# Patient Record
Sex: FEMALE | Race: WHITE
[De-identification: names, ages, dates, MRNs, and addresses within clinical notes are randomized per-mention and may not be internally consistent; named-entity substitution may affect disease eponyms.]

---

## 2017-07-12 ENCOUNTER — HOSPITAL ENCOUNTER (OUTPATIENT)
Dept: HOSPITAL 53 - M RAD | Age: 67
End: 2017-07-12
Attending: UROLOGY
Payer: MEDICARE

## 2017-07-12 DIAGNOSIS — Q62.11: Primary | ICD-10-CM

## 2017-07-12 PROCEDURE — 78708 K FLOW/FUNCT IMAGE W/DRUG: CPT

## 2017-07-12 NOTE — REP
RENAL NUCLEAR SCAN WITH FLOW AND FUNCTION, LASIX ADMINISTRATION:

 

Nuclear GFR evaluation.

 

Following the intravenous administration of 8.6 millicuries technetium 99m MAG 3,

immediate flow images are obtained in the posterior projection showing prompt and

symmetrical perfusion bilaterally.  Delayed renal function images are performed

for 30 minutes in the posterior projection.  The kidneys are normal in size and

position.  No parenchymal defect is seen. There is bilateral symmetrical

excretion.  There is mild bilateral pelvic caliectasis noted.  There is gradual

washout from both kidneys with no evidence of delayed nephrogram.  Split function

is 47.7% on the left and 52.3% on the right.  Time-to-peak is minutes on the left

and 2 minutes on the right.  T-1/2 is 13.7 minutes on the left and 14.7 minutes

on the right.  Renal function curves are slightly shallow in their downward

slopes.

 

Lasix was then given intravenously, 20 mg, and further imaging performed for 21

minutes.  There is complete clearance of radiotracer from both pelvicaliceal

systems indicating no evidence of urinary tract obstruction.  There is mild

postvoid residual in the urinary bladder after voiding.

 

Evaluation of GFR is performed following the intravenous administration of 3.2

millicuries technetium 99m DTPA.  There is bilateral symmetrical uptake in both

kidneys.  Split function is 49.3% on the left and 50.7% on the right.  GFR is

calculated on the left to be 50.2 mL per minute and on the right 51.7 mL per

minute or a total of 101.9 mL per minute.  Normalized GFR per patient body

surface area is 87.7 mL per minute.

 

 

Signed by

Uzair Melendez MD 07/12/2017 05:36 P

## 2019-04-11 ENCOUNTER — HOSPITAL ENCOUNTER (OUTPATIENT)
Dept: HOSPITAL 53 - M SMT | Age: 69
End: 2019-04-11
Attending: INTERNAL MEDICINE
Payer: COMMERCIAL

## 2019-04-11 DIAGNOSIS — Z87.09: Primary | ICD-10-CM

## 2019-04-12 NOTE — REP
Clinical:  Acute bronchitis.

 

Comparison: 04/19/2016 .

 

Technique:  PA and lateral.

 

Findings:

The mediastinum and cardiac silhouette are normal.  The lung fields are clear and

without acute consolidation, effusion, or pneumothorax.  1 cm calcified granuloma

in the left lower lobe remains stable.  The skeletal structures are intact and

normal.

 

Impression:

1.   No acute cardiopulmonary process.

 

 

Electronically Signed by

Jose Manuel Weber MD 04/12/2019 02:39 A

## 2020-08-04 ENCOUNTER — HOSPITAL ENCOUNTER (OUTPATIENT)
Dept: HOSPITAL 53 - M LAB REF | Age: 70
End: 2020-08-04
Attending: DERMATOLOGY
Payer: MEDICARE

## 2020-08-04 DIAGNOSIS — L72.0: Primary | ICD-10-CM

## 2020-08-04 PROCEDURE — 11102 TANGNTL BX SKIN SINGLE LES: CPT

## 2020-08-04 PROCEDURE — 88304 TISSUE EXAM BY PATHOLOGIST: CPT

## 2020-08-04 PROCEDURE — 17110 DESTRUCTION B9 LES UP TO 14: CPT

## 2021-02-20 ENCOUNTER — HOSPITAL ENCOUNTER (OUTPATIENT)
Dept: HOSPITAL 53 - M LABSMTC | Age: 71
Discharge: HOME | End: 2021-02-20
Attending: INTERNAL MEDICINE
Payer: MEDICARE

## 2021-02-20 DIAGNOSIS — Z11.52: Primary | ICD-10-CM

## 2021-06-28 ENCOUNTER — HOSPITAL ENCOUNTER (OUTPATIENT)
Dept: HOSPITAL 53 - M LAB REF | Age: 71
End: 2021-06-28
Attending: PHYSICIAN ASSISTANT
Payer: MEDICARE

## 2021-06-28 DIAGNOSIS — C44.729: ICD-10-CM

## 2021-06-28 DIAGNOSIS — C44.722: Primary | ICD-10-CM

## 2021-06-28 PROCEDURE — 11103 TANGNTL BX SKIN EA SEP/ADDL: CPT

## 2021-06-28 PROCEDURE — 88305 TISSUE EXAM BY PATHOLOGIST: CPT

## 2021-06-28 PROCEDURE — 11102 TANGNTL BX SKIN SINGLE LES: CPT

## 2021-06-28 PROCEDURE — 17110 DESTRUCTION B9 LES UP TO 14: CPT

## 2021-08-13 ENCOUNTER — HOSPITAL ENCOUNTER (OUTPATIENT)
Dept: HOSPITAL 53 - M PLAIMG | Age: 71
End: 2021-08-13
Attending: INTERNAL MEDICINE
Payer: COMMERCIAL

## 2021-08-13 DIAGNOSIS — Z57.39: Primary | ICD-10-CM

## 2021-08-13 NOTE — REP
INDICATION:

OCCUPATIONAL EXPOSURE TO OTHER AIR CONTAMINANTS.



COMPARISON:

04/11/2019



TECHNIQUE:

Two views



FINDINGS:

calcified granuloma left lower lobe unchanged when compared with previous studies.

The lungs are free of active parenchymal disease.  The heart is not enlarged.  There

is no failure.



IMPRESSION:

No active process.  No interval change.





<Electronically signed by Geovanni Chaney > 08/13/21 0049

## 2021-08-20 ENCOUNTER — HOSPITAL ENCOUNTER (OUTPATIENT)
Dept: HOSPITAL 53 - M WHC | Age: 71
End: 2021-08-20
Attending: NURSE PRACTITIONER
Payer: MEDICARE

## 2021-08-20 DIAGNOSIS — Z80.49: ICD-10-CM

## 2021-08-20 DIAGNOSIS — Z80.0: ICD-10-CM

## 2021-08-20 DIAGNOSIS — Z12.31: Primary | ICD-10-CM

## 2021-08-20 DIAGNOSIS — Z80.42: ICD-10-CM

## 2021-08-20 NOTE — REPMRS
Patient History

The patient states she had a clinical breast exam in June 2021.

Patient has history of other cancer at age 71.

Family history of colorectal cancer at age 50 or over in mother, 

prostate cancer at age 50 or over in father, endometrial cancer 

at age 50 or over in daughter.

Reductions of both breasts, 1980.

Taking estrogen for 2 years.

Patient states no breast complaints today. Patient has signed MRS

History Sheet.

 

Digital Woman Screen Mammo: August 20, 2021 - Exam #: 

[id number]

Bilateral CC and MLO view(s) were taken.

 

Technologist: Giovana Mcgarry, Technologist

Prior study comparison: June 12, 2020, bilateral digital mammo 

screening bilat, performed at [facility].  May 

17, 2019, bilateral digital mammo screening bilat, performed at 

[facility] [employer] [facility].

 

FINDINGS: There are scattered fibroglandular densities.  

Screening.

 

Digital screening (2D) mammography was performed bilaterally in 

the CC and MLO projections. Additionally, breast tomosynthesis 

(3D mammography) was performed bilaterally in the CC and MLO 

projections. Todays exam was compared to the prior exam/exams.

 

By history, the patient has no complaints of a palpable breast 

abnormality or other significant breast complaints.

 

The breasts are unchanged in size and shape.There is stable 

post-procedural internal architectural distortion. There are no 

deana-soft tissue densities or spiculated masses. There is no new 

internal architectural distortion. There are no suspicious 

deana-calcific clusters. Skin thickening or nipple retraction is 

not present.

 

IMPRESSION:

The Volpara volumetric breast density category is B, there are 

scattered areas of fibroglandular densities.

BI-RADS Category 2- Benign Findings. There is no evidence of 

malignant alteration of the breasts. Followup examination 

recommended in one year.

The lifetime Tyrer-Cuzick score is   4.4   %

This mammogram was read with the assistance of i[employer] PowerLook 

AMP,an FDA approved computer aided detection system for 

mammography.

 

Negative x-ray reports should not delay surgical consultation if 

a dominant or clinically suspicious mass is present.

 

Not all breast cancers can be identified by mammography. 

Therefore, we recommend that you continue to perform regular 

breast self-examination and physical examination and then 

promptly contact your physician of any concerns or changes.

 

Adenosis and dense breasts may obscure an underlying neoplasm.

 

Assessment: BI-RADS/ACR category 2 mammogram. Benign Findings.

 

Recommendation

Routine screening mammogram of both breasts in 1 year.

 

Electronically Signed By: Fam Hodges DO 08/20/21 0802

## 2022-08-19 ENCOUNTER — HOSPITAL ENCOUNTER (OUTPATIENT)
Dept: HOSPITAL 53 - M WHC | Age: 72
End: 2022-08-19
Attending: FAMILY MEDICINE
Payer: MEDICARE

## 2022-08-19 DIAGNOSIS — Z12.31: Primary | ICD-10-CM

## 2022-09-27 ENCOUNTER — HOSPITAL ENCOUNTER (INPATIENT)
Dept: HOSPITAL 53 - M ED | Age: 72
LOS: 6 days | Discharge: HOME | DRG: 880 | End: 2022-10-03
Attending: STUDENT IN AN ORGANIZED HEALTH CARE EDUCATION/TRAINING PROGRAM | Admitting: PSYCHIATRY & NEUROLOGY
Payer: MEDICARE

## 2022-09-27 VITALS — HEIGHT: 64 IN | BODY MASS INDEX: 24.54 KG/M2 | WEIGHT: 143.74 LBS

## 2022-09-27 DIAGNOSIS — Z90.79: ICD-10-CM

## 2022-09-27 DIAGNOSIS — Z91.51: ICD-10-CM

## 2022-09-27 DIAGNOSIS — I10: ICD-10-CM

## 2022-09-27 DIAGNOSIS — F43.0: Primary | ICD-10-CM

## 2022-09-27 DIAGNOSIS — F43.21: ICD-10-CM

## 2022-09-27 DIAGNOSIS — Z63.0: ICD-10-CM

## 2022-09-27 DIAGNOSIS — E78.5: ICD-10-CM

## 2022-09-27 DIAGNOSIS — I85.00: ICD-10-CM

## 2022-09-27 DIAGNOSIS — J45.909: ICD-10-CM

## 2022-09-27 DIAGNOSIS — Z88.6: ICD-10-CM

## 2022-09-27 DIAGNOSIS — Z63.4: ICD-10-CM

## 2022-09-27 DIAGNOSIS — G43.909: ICD-10-CM

## 2022-09-27 DIAGNOSIS — I25.10: ICD-10-CM

## 2022-09-27 DIAGNOSIS — Z87.891: ICD-10-CM

## 2022-09-27 DIAGNOSIS — M10.9: ICD-10-CM

## 2022-09-27 DIAGNOSIS — Z79.899: ICD-10-CM

## 2022-09-27 DIAGNOSIS — Z88.8: ICD-10-CM

## 2022-09-27 DIAGNOSIS — K75.81: ICD-10-CM

## 2022-09-27 LAB
ALBUMIN SERPL BCG-MCNC: 3.4 GM/DL (ref 3.2–5.2)
ALT SERPL W P-5'-P-CCNC: 36 U/L (ref 12–78)
AMPHETAMINES UR QL SCN: NEGATIVE
APAP SERPL-MCNC: < 2 UG/ML (ref 10–30)
BARBITURATES UR QL SCN: NEGATIVE
BASOPHILS # BLD AUTO: 0 10^3/UL (ref 0–0.2)
BASOPHILS NFR BLD AUTO: 0.8 % (ref 0–1)
BENZODIAZ UR QL SCN: NEGATIVE
BILIRUB CONJ SERPL-MCNC: 0.5 MG/DL (ref 0–0.2)
BILIRUB SERPL-MCNC: 1.9 MG/DL (ref 0.2–1)
BUN SERPL-MCNC: 15 MG/DL (ref 7–18)
BZE UR QL SCN: NEGATIVE
CALCIUM SERPL-MCNC: 8.8 MG/DL (ref 8.8–10.2)
CANNABINOIDS UR QL SCN: NEGATIVE
CHLORIDE SERPL-SCNC: 112 MEQ/L (ref 98–107)
CO2 SERPL-SCNC: 24 MEQ/L (ref 21–32)
CREAT SERPL-MCNC: 0.63 MG/DL (ref 0.55–1.3)
EOSINOPHIL # BLD AUTO: 0.1 10^3/UL (ref 0–0.5)
EOSINOPHIL NFR BLD AUTO: 2.6 % (ref 0–3)
ETHANOL SERPL-MCNC: < 0.003 % (ref 0–0.01)
GFR SERPL CREATININE-BSD FRML MDRD: > 60 ML/MIN/{1.73_M2} (ref 39–?)
GLUCOSE SERPL-MCNC: 95 MG/DL (ref 70–100)
HCT VFR BLD AUTO: 41.8 % (ref 36–47)
HGB BLD-MCNC: 13.9 G/DL (ref 12–15.5)
LYMPHOCYTES # BLD AUTO: 1.4 10^3/UL (ref 1.5–5)
LYMPHOCYTES NFR BLD AUTO: 26.7 % (ref 24–44)
MCH RBC QN AUTO: 32.1 PG (ref 27–33)
MCHC RBC AUTO-ENTMCNC: 33.3 G/DL (ref 32–36.5)
MCV RBC AUTO: 96.5 FL (ref 80–96)
METHADONE UR QL SCN: NEGATIVE
MONOCYTES # BLD AUTO: 0.4 10^3/UL (ref 0–0.8)
MONOCYTES NFR BLD AUTO: 7 % (ref 2–8)
NEUTROPHILS # BLD AUTO: 3.3 10^3/UL (ref 1.5–8.5)
NEUTROPHILS NFR BLD AUTO: 62.7 % (ref 36–66)
OPIATES UR QL SCN: NEGATIVE
PCP UR QL SCN: NEGATIVE
PLATELET # BLD AUTO: 107 10^3/UL (ref 150–450)
POTASSIUM SERPL-SCNC: 3.5 MEQ/L (ref 3.5–5.1)
PROT SERPL-MCNC: 6.7 GM/DL (ref 6.4–8.2)
RBC # BLD AUTO: 4.33 10^6/UL (ref 4–5.4)
SALICYLATES SERPL-MCNC: < 1.7 MG/DL (ref 5–30)
SODIUM SERPL-SCNC: 142 MEQ/L (ref 136–145)
TSH SERPL DL<=0.005 MIU/L-ACNC: 2.03 UIU/ML (ref 0.36–3.74)
WBC # BLD AUTO: 5.3 10^3/UL (ref 4–10)

## 2022-09-27 SDOH — SOCIAL STABILITY - SOCIAL INSECURITY: DISSAPEARANCE AND DEATH OF FAMILY MEMBER: Z63.4

## 2022-09-27 SDOH — SOCIAL STABILITY - SOCIAL INSECURITY: PROBLEMS IN RELATIONSHIP WITH SPOUSE OR PARTNER: Z63.0

## 2022-09-28 VITALS — DIASTOLIC BLOOD PRESSURE: 91 MMHG | SYSTOLIC BLOOD PRESSURE: 157 MMHG

## 2022-09-29 VITALS — SYSTOLIC BLOOD PRESSURE: 111 MMHG | DIASTOLIC BLOOD PRESSURE: 67 MMHG

## 2022-09-29 RX ADMIN — PANTOPRAZOLE SODIUM SCH MG: 40 TABLET, DELAYED RELEASE ORAL at 00:10

## 2022-09-29 RX ADMIN — NADOLOL SCH MG: 20 TABLET ORAL at 21:32

## 2022-09-29 RX ADMIN — FLUTICASONE PROPIONATE AND SALMETEROL XINAFOATE SCH PUFF: 230; 21 AEROSOL, METERED RESPIRATORY (INHALATION) at 22:22

## 2022-09-29 RX ADMIN — PANTOPRAZOLE SODIUM SCH MG: 40 TABLET, DELAYED RELEASE ORAL at 09:07

## 2022-09-29 RX ADMIN — PANTOPRAZOLE SODIUM SCH MG: 40 TABLET, DELAYED RELEASE ORAL at 21:32

## 2022-09-29 RX ADMIN — POTASSIUM CHLORIDE SCH MEQ: 750 TABLET, EXTENDED RELEASE ORAL at 00:10

## 2022-09-29 RX ADMIN — ZONISAMIDE SCH MG: 100 CAPSULE ORAL at 00:09

## 2022-09-29 RX ADMIN — NADOLOL SCH MG: 20 TABLET ORAL at 00:09

## 2022-09-29 RX ADMIN — FUROSEMIDE SCH MG: 20 TABLET ORAL at 09:07

## 2022-09-29 RX ADMIN — POTASSIUM CHLORIDE SCH MEQ: 750 TABLET, EXTENDED RELEASE ORAL at 21:32

## 2022-09-29 RX ADMIN — ZONISAMIDE SCH MG: 100 CAPSULE ORAL at 21:30

## 2022-09-30 VITALS — DIASTOLIC BLOOD PRESSURE: 54 MMHG | SYSTOLIC BLOOD PRESSURE: 90 MMHG

## 2022-09-30 VITALS — DIASTOLIC BLOOD PRESSURE: 77 MMHG | SYSTOLIC BLOOD PRESSURE: 130 MMHG

## 2022-09-30 RX ADMIN — NADOLOL SCH MG: 20 TABLET ORAL at 21:31

## 2022-09-30 RX ADMIN — ZONISAMIDE SCH MG: 100 CAPSULE ORAL at 21:30

## 2022-09-30 RX ADMIN — POTASSIUM CHLORIDE SCH MEQ: 750 TABLET, EXTENDED RELEASE ORAL at 21:30

## 2022-09-30 RX ADMIN — FLUTICASONE PROPIONATE AND SALMETEROL XINAFOATE SCH PUFF: 230; 21 AEROSOL, METERED RESPIRATORY (INHALATION) at 08:33

## 2022-09-30 RX ADMIN — FUROSEMIDE SCH MG: 20 TABLET ORAL at 08:33

## 2022-09-30 RX ADMIN — FLUTICASONE PROPIONATE AND SALMETEROL XINAFOATE SCH PUFF: 230; 21 AEROSOL, METERED RESPIRATORY (INHALATION) at 21:30

## 2022-09-30 RX ADMIN — PANTOPRAZOLE SODIUM SCH MG: 40 TABLET, DELAYED RELEASE ORAL at 21:30

## 2022-09-30 RX ADMIN — ESCITALOPRAM OXALATE SCH MG: 10 TABLET, FILM COATED ORAL at 13:00

## 2022-09-30 RX ADMIN — PANTOPRAZOLE SODIUM SCH MG: 40 TABLET, DELAYED RELEASE ORAL at 08:33

## 2022-10-01 VITALS — DIASTOLIC BLOOD PRESSURE: 78 MMHG | SYSTOLIC BLOOD PRESSURE: 128 MMHG

## 2022-10-01 VITALS — SYSTOLIC BLOOD PRESSURE: 142 MMHG | DIASTOLIC BLOOD PRESSURE: 82 MMHG

## 2022-10-01 RX ADMIN — NADOLOL SCH MG: 20 TABLET ORAL at 20:53

## 2022-10-01 RX ADMIN — PANTOPRAZOLE SODIUM SCH MG: 40 TABLET, DELAYED RELEASE ORAL at 08:43

## 2022-10-01 RX ADMIN — ESCITALOPRAM OXALATE SCH MG: 10 TABLET, FILM COATED ORAL at 08:43

## 2022-10-01 RX ADMIN — ZONISAMIDE SCH MG: 100 CAPSULE ORAL at 20:52

## 2022-10-01 RX ADMIN — FLUTICASONE PROPIONATE AND SALMETEROL XINAFOATE SCH PUFF: 230; 21 AEROSOL, METERED RESPIRATORY (INHALATION) at 20:52

## 2022-10-01 RX ADMIN — PANTOPRAZOLE SODIUM SCH MG: 40 TABLET, DELAYED RELEASE ORAL at 20:52

## 2022-10-01 RX ADMIN — POTASSIUM CHLORIDE SCH MEQ: 750 TABLET, EXTENDED RELEASE ORAL at 20:52

## 2022-10-01 RX ADMIN — FLUTICASONE PROPIONATE AND SALMETEROL XINAFOATE SCH PUFF: 230; 21 AEROSOL, METERED RESPIRATORY (INHALATION) at 08:45

## 2022-10-01 RX ADMIN — FUROSEMIDE SCH MG: 20 TABLET ORAL at 08:43

## 2022-10-02 VITALS — DIASTOLIC BLOOD PRESSURE: 59 MMHG | SYSTOLIC BLOOD PRESSURE: 101 MMHG

## 2022-10-02 VITALS — DIASTOLIC BLOOD PRESSURE: 59 MMHG | SYSTOLIC BLOOD PRESSURE: 127 MMHG

## 2022-10-02 VITALS — SYSTOLIC BLOOD PRESSURE: 127 MMHG | DIASTOLIC BLOOD PRESSURE: 59 MMHG

## 2022-10-02 RX ADMIN — POTASSIUM CHLORIDE SCH MEQ: 750 TABLET, EXTENDED RELEASE ORAL at 20:25

## 2022-10-02 RX ADMIN — ZONISAMIDE SCH MG: 100 CAPSULE ORAL at 20:24

## 2022-10-02 RX ADMIN — NADOLOL SCH MG: 20 TABLET ORAL at 20:27

## 2022-10-02 RX ADMIN — PANTOPRAZOLE SODIUM SCH MG: 40 TABLET, DELAYED RELEASE ORAL at 07:28

## 2022-10-02 RX ADMIN — FLUTICASONE PROPIONATE AND SALMETEROL XINAFOATE SCH PUFF: 230; 21 AEROSOL, METERED RESPIRATORY (INHALATION) at 07:29

## 2022-10-02 RX ADMIN — FLUTICASONE PROPIONATE AND SALMETEROL XINAFOATE SCH PUFF: 230; 21 AEROSOL, METERED RESPIRATORY (INHALATION) at 20:24

## 2022-10-02 RX ADMIN — ESCITALOPRAM OXALATE SCH MG: 10 TABLET, FILM COATED ORAL at 07:29

## 2022-10-02 RX ADMIN — FUROSEMIDE SCH MG: 20 TABLET ORAL at 07:28

## 2022-10-02 RX ADMIN — PANTOPRAZOLE SODIUM SCH MG: 40 TABLET, DELAYED RELEASE ORAL at 20:25

## 2022-10-03 VITALS — DIASTOLIC BLOOD PRESSURE: 3 MMHG | SYSTOLIC BLOOD PRESSURE: 134 MMHG

## 2022-10-03 RX ADMIN — PANTOPRAZOLE SODIUM SCH MG: 40 TABLET, DELAYED RELEASE ORAL at 08:17

## 2022-10-03 RX ADMIN — FUROSEMIDE SCH MG: 20 TABLET ORAL at 08:17

## 2022-10-03 RX ADMIN — ESCITALOPRAM OXALATE SCH MG: 10 TABLET, FILM COATED ORAL at 08:17

## 2022-10-03 RX ADMIN — FLUTICASONE PROPIONATE AND SALMETEROL XINAFOATE SCH PUFF: 230; 21 AEROSOL, METERED RESPIRATORY (INHALATION) at 08:00

## 2022-12-08 ENCOUNTER — HOSPITAL ENCOUNTER (OUTPATIENT)
Dept: HOSPITAL 53 - M PLAIMG | Age: 72
End: 2022-12-08
Attending: INTERNAL MEDICINE
Payer: MEDICARE

## 2022-12-08 DIAGNOSIS — Z87.09: Primary | ICD-10-CM

## 2022-12-08 DIAGNOSIS — J98.4: ICD-10-CM

## 2022-12-08 DIAGNOSIS — J68.3: ICD-10-CM

## 2023-06-30 ENCOUNTER — HOSPITAL ENCOUNTER (OUTPATIENT)
Dept: HOSPITAL 53 - M RAD | Age: 73
End: 2023-06-30
Payer: MEDICARE

## 2023-06-30 DIAGNOSIS — I10: ICD-10-CM

## 2023-06-30 DIAGNOSIS — G30.9: Primary | ICD-10-CM

## 2023-07-28 ENCOUNTER — HOSPITAL ENCOUNTER (OUTPATIENT)
Dept: HOSPITAL 53 - M RAD | Age: 73
End: 2023-07-28
Attending: INTERNAL MEDICINE
Payer: MEDICARE

## 2023-07-28 DIAGNOSIS — Z98.1: ICD-10-CM

## 2023-07-28 DIAGNOSIS — M47.892: ICD-10-CM

## 2023-07-28 DIAGNOSIS — K75.81: Primary | ICD-10-CM

## 2023-07-28 DIAGNOSIS — K74.60: ICD-10-CM

## 2023-08-21 ENCOUNTER — HOSPITAL ENCOUNTER (OUTPATIENT)
Dept: HOSPITAL 53 - M WHC | Age: 73
End: 2023-08-21
Payer: MEDICARE

## 2023-08-21 DIAGNOSIS — Z12.31: Primary | ICD-10-CM

## 2024-08-28 ENCOUNTER — HOSPITAL ENCOUNTER (OUTPATIENT)
Dept: HOSPITAL 53 - M WHC | Age: 74
End: 2024-08-28
Payer: MEDICARE

## 2024-08-28 DIAGNOSIS — Z12.31: Primary | ICD-10-CM

## 2024-08-28 PROCEDURE — 77063 BREAST TOMOSYNTHESIS BI: CPT

## 2024-08-28 PROCEDURE — 77067 SCR MAMMO BI INCL CAD: CPT
